# Patient Record
Sex: FEMALE
[De-identification: names, ages, dates, MRNs, and addresses within clinical notes are randomized per-mention and may not be internally consistent; named-entity substitution may affect disease eponyms.]

---

## 2023-02-22 ENCOUNTER — NURSE TRIAGE (OUTPATIENT)
Dept: OTHER | Facility: CLINIC | Age: 63
End: 2023-02-22

## 2023-02-22 NOTE — TELEPHONE ENCOUNTER
Caller requesting medication for UTI - Has had UTI's in the past and is very familiar. Doesn't want a Dr's visit just a prescription. [facility] of patient: [facility] MRN: [id number]    Provider: Kip Saldana    Subjective: Caller states \"UTI - requesting abx\"     Current Symptoms:   UTI     What has been tried:   None - tried to make an appt but no openings until Friday. Recommended disposition:   Pt requested I send a not over to MD with requests. Care advice provided, patient verbalizes understanding; denies any other questions or concerns. Outcome: RN advised if she is having UTI sxs and cannot get in the next best thing would be UCC    No Appointments available, patient referred to [facility]      This triage is a result of a call to the [place] [id number]      Reason for Disposition   [1] Caller requesting NON-URGENT health information AND [2] PCP's office is the best resource    Protocols used:  Information Only Call - No Triage-ADULT-